# Patient Record
Sex: FEMALE | Race: OTHER | Employment: FULL TIME | URBAN - METROPOLITAN AREA
[De-identification: names, ages, dates, MRNs, and addresses within clinical notes are randomized per-mention and may not be internally consistent; named-entity substitution may affect disease eponyms.]

---

## 2021-01-30 ENCOUNTER — OFFICE VISIT (OUTPATIENT)
Dept: FAMILY MEDICINE CLINIC | Facility: CLINIC | Age: 61
End: 2021-01-30
Payer: COMMERCIAL

## 2021-01-30 VITALS
BODY MASS INDEX: 44.65 KG/M2 | HEIGHT: 65 IN | DIASTOLIC BLOOD PRESSURE: 77 MMHG | WEIGHT: 268 LBS | HEART RATE: 75 BPM | SYSTOLIC BLOOD PRESSURE: 123 MMHG

## 2021-01-30 DIAGNOSIS — E78.2 MIXED HYPERLIPIDEMIA: ICD-10-CM

## 2021-01-30 DIAGNOSIS — E55.9 VITAMIN D DEFICIENCY: ICD-10-CM

## 2021-01-30 DIAGNOSIS — E11.9 DIABETES MELLITUS WITHOUT COMPLICATION (HCC): Primary | ICD-10-CM

## 2021-01-30 PROCEDURE — 3008F BODY MASS INDEX DOCD: CPT | Performed by: PHYSICIAN ASSISTANT

## 2021-01-30 PROCEDURE — 3074F SYST BP LT 130 MM HG: CPT | Performed by: PHYSICIAN ASSISTANT

## 2021-01-30 PROCEDURE — 3078F DIAST BP <80 MM HG: CPT | Performed by: PHYSICIAN ASSISTANT

## 2021-01-30 PROCEDURE — 99202 OFFICE O/P NEW SF 15 MIN: CPT | Performed by: PHYSICIAN ASSISTANT

## 2021-01-30 RX ORDER — GLIPIZIDE 10 MG/1
10 TABLET, FILM COATED, EXTENDED RELEASE ORAL DAILY
COMMUNITY
Start: 2020-04-29 | End: 2021-01-30

## 2021-01-30 RX ORDER — EMPAGLIFLOZIN 10 MG/1
10 TABLET, FILM COATED ORAL EVERY MORNING
Qty: 90 TABLET | Refills: 1 | Status: SHIPPED | OUTPATIENT
Start: 2021-01-30 | End: 2021-03-01

## 2021-01-30 RX ORDER — GLIPIZIDE 10 MG/1
10 TABLET, FILM COATED, EXTENDED RELEASE ORAL DAILY
Qty: 90 TABLET | Refills: 1 | Status: SHIPPED | OUTPATIENT
Start: 2021-01-30 | End: 2021-04-30

## 2021-01-30 RX ORDER — LISINOPRIL 5 MG/1
5 TABLET ORAL DAILY
COMMUNITY
Start: 2020-04-29 | End: 2021-01-30

## 2021-01-30 RX ORDER — ROSUVASTATIN CALCIUM 5 MG/1
5 TABLET, COATED ORAL DAILY
Qty: 90 TABLET | Refills: 1 | Status: SHIPPED | OUTPATIENT
Start: 2021-01-30 | End: 2021-02-02

## 2021-01-30 RX ORDER — LISINOPRIL 5 MG/1
5 TABLET ORAL DAILY
Qty: 90 TABLET | Refills: 1 | Status: SHIPPED | OUTPATIENT
Start: 2021-01-30 | End: 2021-04-30

## 2021-01-30 RX ORDER — ERGOCALCIFEROL 1.25 MG/1
50000 CAPSULE ORAL
COMMUNITY

## 2021-01-30 RX ORDER — ROSUVASTATIN CALCIUM 5 MG/1
5 TABLET, COATED ORAL DAILY
COMMUNITY
Start: 2019-02-20 | End: 2021-01-30

## 2021-01-30 NOTE — PROGRESS NOTES
HPI:     HPI  62year-old female is here in the office for established care. Patient is doing fine at this time. Patient requests a refill of her medications. Some of her medications ran out for the past 2 weeks.   Patient denies of chest pain, SOB, N/V/C/D Occupational History      Not on file    Social Needs      Financial resource strain: Not on file      Food insecurity        Worry: Not on file        Inability: Not on file      Transportation needs        Medical: Not on file        Non-medical: Not on Constitutional: She is oriented to person, place, and time. She appears well-developed and well-nourished. She is cooperative. No distress. HENT:   Head: Normocephalic and atraumatic.    Right Ear: External ear normal.   Left Ear: External ear normal.

## 2021-02-01 ENCOUNTER — TELEPHONE (OUTPATIENT)
Dept: FAMILY MEDICINE CLINIC | Facility: CLINIC | Age: 61
End: 2021-02-01

## 2021-02-01 NOTE — TELEPHONE ENCOUNTER
2ND FAX RECEIVED FOR DRUG BELOW:    MESSAGE: THE PREFERRED ALTERNATIVE IS SIMVASTATIN TABLETS, ATORVASTATIN TABLETS, PRAVASTATIN TABLETS, LOVASTATIN TABLETS.  PLEASE CALL/ FAX THE PHARMACY TO CHANGE MEDICATION ALONG WITH STRENGTH, DIRECTIONS, QUANTITY, AND

## 2021-02-01 NOTE — TELEPHONE ENCOUNTER
Prior authorization for Rosuvastatin completed w/ [employer] on cover my meds Key: [id number], turn around time 1-5 days.

## 2021-02-01 NOTE — TELEPHONE ENCOUNTER
Fax received. Prior authorization needed:    •  Rosuvastatin Calcium 5 MG Oral Tab, Take 1 tablet (5 mg total) by mouth daily. , Disp: 90 tablet, Rfl: 1    Patient ID#: [id number]    TEL: [phone]

## 2021-02-02 ENCOUNTER — TELEPHONE (OUTPATIENT)
Dept: FAMILY MEDICINE CLINIC | Facility: CLINIC | Age: 61
End: 2021-02-02

## 2021-02-02 RX ORDER — ATORVASTATIN CALCIUM 10 MG/1
10 TABLET, FILM COATED ORAL NIGHTLY
Qty: 90 TABLET | Refills: 3 | Status: SHIPPED | OUTPATIENT
Start: 2021-02-02

## 2021-02-02 NOTE — TELEPHONE ENCOUNTER
Spoke to [employer] and they don't show a request. What they show is that patient doesn't have active coverage and will need to present a new insurance card to [employer].  They will let her know

## 2021-02-02 NOTE — TELEPHONE ENCOUNTER
Discontinue Crestor 5 mg due to cost. Will switch to Lipitor 10 mg PO at bedtime. Rx sent to pharmacy.

## 2021-02-02 NOTE — TELEPHONE ENCOUNTER
[facility] [place] and spoke with Colin, pharmacist, states that there is no cheaper alternatives, states price is high because of patient's deductible, states patient needs to meet  her deductible first, advised to get 30 day supply and price will be $99.

## 2021-02-02 NOTE — TELEPHONE ENCOUNTER
Rosuvastatin Calcium 5 MG Oral Tab      Patient states the above medication is 298.21 co pay. Can she get a generic brand if possible.

## 2021-02-03 NOTE — TELEPHONE ENCOUNTER
Spoke with Tameka Bishop at [employer] receipt of Lipitor and #90 is $30--she will notify patient. No further questions/concerns at this time.

## (undated) NOTE — LETTER
04/01/21        Nona Santos  [address]  [address] [facility] [address]      Dear Jenna Faria records indicate that you have outstanding lab work and or testing that was ordered for you and has not yet been completed:  Orders Placed This Encounter

## (undated) NOTE — LETTER
07/01/21        Jack Knutson  [address]  [address] [facility] [id number]      Dear Roe Weeks records indicate that you have outstanding lab work and or testing that was ordered for you and has not yet been completed:  Orders Placed This Encounter

## (undated) NOTE — LETTER
10/01/21        Marni Moreno  [address]  [address] [facility] [address]      Dear Yuri Barros records indicate that you have outstanding lab work and or testing that was ordered for you and has not yet been completed:  Orders Placed This Encounter